# Patient Record
Sex: MALE | Race: WHITE | NOT HISPANIC OR LATINO | Employment: FULL TIME | URBAN - METROPOLITAN AREA
[De-identification: names, ages, dates, MRNs, and addresses within clinical notes are randomized per-mention and may not be internally consistent; named-entity substitution may affect disease eponyms.]

---

## 2020-12-08 ENCOUNTER — OFFICE VISIT (OUTPATIENT)
Dept: FAMILY MEDICINE CLINIC | Facility: CLINIC | Age: 40
End: 2020-12-08

## 2020-12-08 VITALS
TEMPERATURE: 96 F | OXYGEN SATURATION: 99 % | HEART RATE: 72 BPM | RESPIRATION RATE: 16 BRPM | BODY MASS INDEX: 27.04 KG/M2 | WEIGHT: 204 LBS | HEIGHT: 73 IN | DIASTOLIC BLOOD PRESSURE: 80 MMHG | SYSTOLIC BLOOD PRESSURE: 124 MMHG

## 2020-12-08 DIAGNOSIS — Z11.3 ROUTINE SCREENING FOR STI (SEXUALLY TRANSMITTED INFECTION): ICD-10-CM

## 2020-12-08 DIAGNOSIS — Z13.220 ENCOUNTER FOR LIPID SCREENING FOR CARDIOVASCULAR DISEASE: ICD-10-CM

## 2020-12-08 DIAGNOSIS — Z13.6 ENCOUNTER FOR LIPID SCREENING FOR CARDIOVASCULAR DISEASE: ICD-10-CM

## 2020-12-08 DIAGNOSIS — L70.8 OTHER ACNE: ICD-10-CM

## 2020-12-08 DIAGNOSIS — Z13.1 SCREENING FOR DIABETES MELLITUS: ICD-10-CM

## 2020-12-08 DIAGNOSIS — Z23 NEED FOR VACCINATION: ICD-10-CM

## 2020-12-08 DIAGNOSIS — Z00.00 ROUTINE HEALTH MAINTENANCE: Primary | ICD-10-CM

## 2020-12-08 PROBLEM — S83.241A TEAR OF MEDIAL MENISCUS OF RIGHT KNEE: Status: ACTIVE | Noted: 2019-05-24

## 2020-12-08 PROCEDURE — 90472 IMMUNIZATION ADMIN EACH ADD: CPT | Performed by: FAMILY MEDICINE

## 2020-12-08 PROCEDURE — 90686 IIV4 VACC NO PRSV 0.5 ML IM: CPT | Performed by: FAMILY MEDICINE

## 2020-12-08 PROCEDURE — 99385 PREV VISIT NEW AGE 18-39: CPT | Performed by: FAMILY MEDICINE

## 2020-12-08 PROCEDURE — 90471 IMMUNIZATION ADMIN: CPT | Performed by: FAMILY MEDICINE

## 2020-12-08 PROCEDURE — 90715 TDAP VACCINE 7 YRS/> IM: CPT | Performed by: FAMILY MEDICINE

## 2020-12-08 RX ORDER — TRETINOIN 0.05 G/100G
1 GEL TOPICAL NIGHTLY
Qty: 45 G | Refills: 5 | Status: SHIPPED | OUTPATIENT
Start: 2020-12-08 | End: 2022-05-02 | Stop reason: SDUPTHER

## 2020-12-08 NOTE — PROGRESS NOTES
Tobias Varghese is 40 y.o. and presents today for:     Chief Complaint   Patient presents with   • Annual Exam       HPI     Here today to reestablish care.  Previously seen by myself at [facility].    Overall considers himself quite healthy.  Has a few minor concerns.  Would like STI testing.  He and his  had an additional partner briefly in the last few months.  No current signs or symptoms but likes to be on the safe side.    Hoping for a prescription for tretinoin gel to help with some sun related skin damage.  Is of [place] descent with light skin and has had lots of sun exposure over the years.    Stays physically active, eats a balanced diet.  Would like to get some routine blood work today.    Feels that he is managing the stress of the pandemic well.  Has good family and social support.  Works out regularly to maintain physical and mental health.    Would like to get a flu shot and tetanus booster today.    The following portions of the patient's history were reviewed and updated as appropriate: allergies, current medications, past family history, past medical history, past social history, past surgical history and problem list.    Review of Systems   Constitutional: Negative for activity change, chills, fatigue, fever and unexpected weight change.   HENT: Negative for sore throat.    Respiratory: Negative for cough, shortness of breath and wheezing.    Cardiovascular: Negative for chest pain, palpitations and leg swelling.   Gastrointestinal: Negative for abdominal distention, abdominal pain, constipation, diarrhea, nausea and vomiting.   Genitourinary: Negative for dysuria.   Musculoskeletal: Positive for myalgias. Negative for arthralgias.   Skin: Negative for rash.   Neurological: Negative for dizziness.   Psychiatric/Behavioral: Negative for confusion and dysphoric mood. The patient is not nervous/anxious.        Objective  Vitals:    12/08/20 1523   BP: 124/80   Pulse: 72   Resp: 16   Temp: 96 °F (35.6  °C)   SpO2: 99%     Body mass index is 26.91 kg/m².    Physical Exam  Vitals signs and nursing note reviewed.   Constitutional:       General: He is not in acute distress.     Appearance: Normal appearance. He is well-developed. He is not ill-appearing.   HENT:      Right Ear: Tympanic membrane, ear canal and external ear normal.      Left Ear: Tympanic membrane, ear canal and external ear normal.      Nose: Nose normal.      Mouth/Throat:      Mouth: Mucous membranes are moist.      Pharynx: Oropharynx is clear.   Eyes:      Extraocular Movements: Extraocular movements intact.      Conjunctiva/sclera: Conjunctivae normal.      Pupils: Pupils are equal, round, and reactive to light.   Neck:      Musculoskeletal: Normal range of motion and neck supple.   Cardiovascular:      Rate and Rhythm: Normal rate and regular rhythm.      Pulses: Normal pulses.      Heart sounds: Normal heart sounds. No murmur.   Pulmonary:      Effort: Pulmonary effort is normal. No respiratory distress.      Breath sounds: Normal breath sounds. No wheezing.   Abdominal:      General: Bowel sounds are normal. There is no distension.      Palpations: Abdomen is soft.      Tenderness: There is no abdominal tenderness. There is no guarding or rebound.   Musculoskeletal: Normal range of motion.         General: No tenderness.   Skin:     General: Skin is warm and dry.   Neurological:      General: No focal deficit present.      Mental Status: He is alert and oriented to person, place, and time.      Sensory: No sensory deficit.   Psychiatric:         Mood and Affect: Mood normal.         Behavior: Behavior normal.           Current Outpatient Medications:   •  tretinoin (ALTRALIN) 0.05 % gel, Apply 1 application topically to the appropriate area as directed Every Night., Disp: 45 g, Rfl: 5  Current outpatient and discharge medications have been reconciled for the patient.  Reviewed by: Noah Rodriguez MD      Procedures    Lab Results (most  recent)     None                Diagnoses and all orders for this visit:    1. Routine health maintenance (Primary)    2. Screening for diabetes mellitus  -     Comprehensive Metabolic Panel    3. Encounter for lipid screening for cardiovascular disease  -     Lipid Panel With LDL / HDL Ratio    4. Need for vaccination  -     Tdap Vaccine Greater Than or Equal To 8yo IM  -     Fluarix/Fluzone/Afluria Quad>6 Months    5. Routine screening for STI (sexually transmitted infection)  -     Chlamydia trachomatis, Neisseria gonorrhoeae, PCR - Urine, Urine, Clean Catch  -     RPR  -     HIV-1 / O / 2 Ag / Antibody 4th Generation    6. Other acne  -     tretinoin (ALTRALIN) 0.05 % gel; Apply 1 application topically to the appropriate area as directed Every Night.  Dispense: 45 g; Refill: 5    Orders as above.  I will contact him with results as available.  Tretinoin as requested.  Vaccines as requested.    Recommended follow-up as below.  Encouraged communication via [employer] in the meantime.    Any information loaded into the AVS was placed there by KRISHAN Rodriguez MD, and patient was counseled on the same.   Return in about 6 months (around 6/8/2021), or if symptoms worsen or fail to improve.      KRISHAN Rodriguez MD    Prevention counseling performed as below:  Mindfulness meditation for stress management.

## 2020-12-09 LAB
ALBUMIN SERPL-MCNC: 4.5 G/DL (ref 4–5)
ALBUMIN/GLOB SERPL: 1.8 {RATIO} (ref 1.2–2.2)
ALP SERPL-CCNC: 40 IU/L (ref 39–117)
ALT SERPL-CCNC: 16 IU/L (ref 0–44)
AST SERPL-CCNC: 30 IU/L (ref 0–40)
BILIRUB SERPL-MCNC: 0.5 MG/DL (ref 0–1.2)
BUN SERPL-MCNC: 22 MG/DL (ref 6–20)
BUN/CREAT SERPL: 22 (ref 9–20)
CALCIUM SERPL-MCNC: 10.2 MG/DL (ref 8.7–10.2)
CHLORIDE SERPL-SCNC: 101 MMOL/L (ref 96–106)
CHOLEST SERPL-MCNC: 255 MG/DL (ref 100–199)
CO2 SERPL-SCNC: 24 MMOL/L (ref 20–29)
CREAT SERPL-MCNC: 0.98 MG/DL (ref 0.76–1.27)
GLOBULIN SER CALC-MCNC: 2.5 G/DL (ref 1.5–4.5)
GLUCOSE SERPL-MCNC: 93 MG/DL (ref 65–99)
HDLC SERPL-MCNC: 79 MG/DL
HIV 1+2 AB+HIV1 P24 AG SERPL QL IA: NON REACTIVE
LDLC SERPL CALC-MCNC: 161 MG/DL (ref 0–99)
LDLC/HDLC SERPL: 2 RATIO (ref 0–3.6)
POTASSIUM SERPL-SCNC: 4.4 MMOL/L (ref 3.5–5.2)
PROT SERPL-MCNC: 7 G/DL (ref 6–8.5)
RPR SER QL: NON REACTIVE
SODIUM SERPL-SCNC: 139 MMOL/L (ref 134–144)
TRIGL SERPL-MCNC: 89 MG/DL (ref 0–149)
VLDLC SERPL CALC-MCNC: 15 MG/DL (ref 5–40)

## 2020-12-09 NOTE — PATIENT INSTRUCTIONS
Mindfulness apps: Headspace, Calm    Mindfulness-Based Stress Reduction  Mindfulness-based stress reduction (MBSR) is a program that helps people learn to practice mindfulness. Mindfulness is the practice of intentionally paying attention to the present moment. It can be learned and practiced through techniques such as education, breathing exercises, meditation, and yoga. MBSR includes several mindfulness techniques in one program.  MBSR works best when you understand the treatment, are willing to try new things, and can commit to spending time practicing what you learn. MBSR training may include learning about:  · How your emotions, thoughts, and reactions affect your body.  · New ways to respond to things that cause negative thoughts to start (triggers).  · How to notice your thoughts and let go of them.  · Practicing awareness of everyday things that you normally do without thinking.  · The techniques and goals of different types of meditation.  What are the benefits of MBSR?  MBSR can have many benefits, which include helping you to:  · Develop self-awareness. This refers to knowing and understanding yourself.  · Learn skills and attitudes that help you to participate in your own health care.  · Learn new ways to care for yourself.  · Be more accepting about how things are, and let things go.  · Be less judgmental and approach things with an open mind.  · Be patient with yourself and trust yourself more.  MBSR has also been shown to:  · Reduce negative emotions, such as depression and anxiety.  · Improve memory and focus.  · Change how you sense and approach pain.  · Boost your body's ability to fight infections.  · Help you connect better with other people.  · Improve your sense of well-being.  Follow these instructions at home:    · Find a local in-person or online MBSR program.  · Set aside some time regularly for mindfulness practice.  · Find a mindfulness practice that works best for you. This may include  one or more of the following:  ? Meditation. Meditation involves focusing your mind on a certain thought or activity.  ? Breathing awareness exercises. These help you to stay present by focusing on your breath.  ? Body scan. For this practice, you lie down and pay attention to each part of your body from head to toe. You can identify tension and soreness and intentionally relax parts of your body.  ? Yoga. Yoga involves stretching and breathing, and it can improve your ability to move and be flexible. It can also provide an experience of testing your body's limits, which can help you release stress.  ? Mindful eating. This way of eating involves focusing on the taste, texture, color, and smell of each bite of food. Because this slows down eating and helps you feel full sooner, it can be an important part of a weight-loss plan.  · Find a podcast or recording that provides guidance for breathing awareness, body scan, or meditation exercises. You can listen to these any time when you have a free moment to rest without distractions.  · Follow your treatment plan as told by your health care provider. This may include taking regular medicines and making changes to your diet or lifestyle as recommended.  How to practice mindfulness  To do a basic awareness exercise:  · Find a comfortable place to sit.  · Pay attention to the present moment. Observe your thoughts, feelings, and surroundings just as they are.  · Avoid placing judgment on yourself, your feelings, or your surroundings. Make note of any judgment that comes up, and let it go.  · Your mind may wander, and that is okay. Make note of when your thoughts drift, and return your attention to the present moment.  To do basic mindfulness meditation:  · Find a comfortable place to sit. This may include a stable chair or a firm floor cushion.  ? Sit upright with your back straight. Let your arms fall next to your side with your hands resting on your legs.  ? If sitting in  a chair, rest your feet flat on the floor.  ? If sitting on a cushion, cross your legs in front of you.  · Keep your head in a neutral position with your chin dropped slightly. Relax your jaw and rest the tip of your tongue on the roof of your mouth. Drop your gaze to the floor. You can close your eyes if you like.  · Breathe normally and pay attention to your breath. Feel the air moving in and out of your nose. Feel your belly expanding and relaxing with each breath.  · Your mind may wander, and that is okay. Make note of when your thoughts drift, and return your attention to your breath.  · Avoid placing judgment on yourself, your feelings, or your surroundings. Make note of any judgment or feelings that come up, let them go, and bring your attention back to your breath.  · When you are ready, lift your gaze or open your eyes. Pay attention to how your body feels after the meditation.  Where to find more information  You can find more information about MBSR from:  · Your health care provider.  · Community-based meditation centers or programs.  · Programs offered near you.  Summary  · Mindfulness-based stress reduction (MBSR) is a program that teaches you how to intentionally pay attention to the present moment. It is used with other treatments to help you cope better with daily stress, emotions, and pain.  · MBSR focuses on developing self-awareness, which allows you to respond to life stress without judgment or negative emotions.  · MBSR programs may involve learning different mindfulness practices, such as breathing exercises, meditation, yoga, body scan, or mindful eating. Find a mindfulness practice that works best for you, and set aside time for it on a regular basis.  This information is not intended to replace advice given to you by your health care provider. Make sure you discuss any questions you have with your health care provider.  Document Released: 04/26/2018 Document Revised: 11/30/2018 Document  Reviewed: 04/26/2018  Elsevier Patient Education © 2020 Elsevier Inc.

## 2020-12-10 LAB
C TRACH RRNA SPEC QL NAA+PROBE: NEGATIVE
N GONORRHOEA RRNA SPEC QL NAA+PROBE: NEGATIVE

## 2021-01-07 ENCOUNTER — TELEPHONE (OUTPATIENT)
Dept: FAMILY MEDICINE CLINIC | Facility: CLINIC | Age: 41
End: 2021-01-07

## 2021-01-07 NOTE — TELEPHONE ENCOUNTER
PT CALLED STATING HE IS COVID POSITIVE AND IS EXPERIENCING SOA, COUGH, AND HAD A LOW GRADE FEVER YESTERDAY BUT NO FEVER TODAY.    HE IS WANTING TO KNOW IF THERE IS ANY RECOMMENDATIONS FOR WHAT TO DO OTHER THAN QUARANTINE.    PLEASE ADVISE.    CALLBACK NUMBER: [phone]

## 2021-01-07 NOTE — TELEPHONE ENCOUNTER
Called pt and talked to him about things to do to help with symptoms    Advised to call with any questions.  Also advised that if shortness of breath becomes a problem then he can go to the er

## 2021-03-02 ENCOUNTER — TELEPHONE (OUTPATIENT)
Dept: FAMILY MEDICINE CLINIC | Facility: CLINIC | Age: 41
End: 2021-03-02

## 2021-03-02 NOTE — TELEPHONE ENCOUNTER
PATIENT CALLING BECAUSE HE HAS HAD 3 KNEE SURGERY'S IN PAST 3 YEARS AND HE IS HAVING ISSUES AGAIN....     HE IS REQUESTING A RECOMMENDATION FOR ANOTHER KNEE SPECIALIST TO GET A SECOND OPINION BEFORE MORE SURGERIES ARE DONE IF NOT NEEDED...    IS THERE A KNEE SPECIALIST THAT DR. MILLER WOULD RECOMMEND PATIENT GET A SECOND OPINION FROM??    PLEASE CALL AND INFORM PATIENT.     PATIENT CAN BE REACHED AT: [phone]

## 2021-04-16 ENCOUNTER — BULK ORDERING (OUTPATIENT)
Dept: CASE MANAGEMENT | Facility: OTHER | Age: 41
End: 2021-04-16

## 2021-04-16 DIAGNOSIS — Z23 IMMUNIZATION DUE: ICD-10-CM

## 2021-11-30 ENCOUNTER — OFFICE VISIT (OUTPATIENT)
Dept: FAMILY MEDICINE CLINIC | Facility: CLINIC | Age: 41
End: 2021-11-30

## 2021-11-30 VITALS
HEART RATE: 78 BPM | BODY MASS INDEX: 26.25 KG/M2 | RESPIRATION RATE: 16 BRPM | OXYGEN SATURATION: 99 % | DIASTOLIC BLOOD PRESSURE: 64 MMHG | WEIGHT: 199 LBS | SYSTOLIC BLOOD PRESSURE: 120 MMHG

## 2021-11-30 DIAGNOSIS — Z11.59 ENCOUNTER FOR HEPATITIS C SCREENING TEST FOR LOW RISK PATIENT: ICD-10-CM

## 2021-11-30 DIAGNOSIS — Z00.00 ROUTINE HEALTH MAINTENANCE: Primary | ICD-10-CM

## 2021-11-30 DIAGNOSIS — Z11.3 ROUTINE SCREENING FOR STI (SEXUALLY TRANSMITTED INFECTION): ICD-10-CM

## 2021-11-30 DIAGNOSIS — Z13.220 ENCOUNTER FOR LIPID SCREENING FOR CARDIOVASCULAR DISEASE: ICD-10-CM

## 2021-11-30 DIAGNOSIS — Z13.1 SCREENING FOR DIABETES MELLITUS: ICD-10-CM

## 2021-11-30 DIAGNOSIS — Z23 NEED FOR VACCINATION: ICD-10-CM

## 2021-11-30 DIAGNOSIS — Z13.6 ENCOUNTER FOR LIPID SCREENING FOR CARDIOVASCULAR DISEASE: ICD-10-CM

## 2021-11-30 PROCEDURE — 99396 PREV VISIT EST AGE 40-64: CPT | Performed by: FAMILY MEDICINE

## 2021-11-30 RX ORDER — MELOXICAM 15 MG/1
15 TABLET ORAL DAILY
COMMUNITY
Start: 2021-08-30 | End: 2022-05-26

## 2021-11-30 NOTE — PROGRESS NOTES
Chief Complaint  Physical   Subjective    History of Present Illness {CC  Problem List  Visit  Diagnosis   Encounters  Notes  Medications  Labs  Result Review Imaging  Media :23}     Tobias Varghese presents to [facility] for Annual Exam and Labs Only.  History of Present Illness     Here today for routine health maintenance and annual exam. Doing well overall, considers himself fairly healthy. 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    Due for some routine blood work today. Would also like STI screening. No specific concerns or symptoms at this time, just likes to stay updated.    Has good family and social support. Enjoys his work. Gets regular dental checks.    Objective     Vital Signs:   /64   Pulse 78   Resp 16   Wt 90.3 kg (199 lb)   SpO2 99%   BMI 26.25 kg/m²   Physical Exam  Vitals and nursing note reviewed.   Constitutional:       General: He is not in acute distress.     Appearance: Normal appearance. He is well-developed. He is not ill-appearing.   HENT:      Right Ear: Tympanic membrane, ear canal and external ear normal.      Left Ear: Tympanic membrane, ear canal and external ear normal.      Nose: Nose normal.      Mouth/Throat:      Mouth: Mucous membranes are moist.      Pharynx: Oropharynx is clear.   Eyes:      Extraocular Movements: Extraocular movements intact.      Conjunctiva/sclera: Conjunctivae normal.      Pupils: Pupils are equal, round, and reactive to light.   Cardiovascular:      Rate and Rhythm: Normal rate and regular rhythm.      Pulses: Normal pulses.      Heart sounds: Normal heart sounds. No murmur heard.      Pulmonary:      Effort: Pulmonary effort is normal. No respiratory distress.      Breath sounds: Normal breath sounds. No wheezing or rales.   Abdominal:      General: Abdomen is flat. Bowel sounds are normal. There is no  distension.      Palpations: Abdomen is soft.      Tenderness: There is no abdominal tenderness. There is no guarding or rebound.   Musculoskeletal:         General: No tenderness. Normal range of motion.      Cervical back: Normal range of motion and neck supple.   Skin:     General: Skin is warm and dry.   Neurological:      General: No focal deficit present.      Mental Status: He is alert and oriented to person, place, and time. Mental status is at baseline.      Sensory: No sensory deficit.   Psychiatric:         Mood and Affect: Mood normal.         Behavior: Behavior normal.          Result Review  Data Reviewed:{ Labs  Result Review  Imaging  Med Tab  Media :23}                   Assessment and Plan {CC Problem List  Visit Diagnosis  ROS  Review (Popup)  Health Maintenance  Quality  BestPractice  Medications  SmartSets  SnapShot Encounters  Media :23}   Diagnoses and all orders for this visit:    1. Routine health maintenance (Primary)    2. Screening for diabetes mellitus  -     Comprehensive Metabolic Panel    3. Encounter for lipid screening for cardiovascular disease  -     Lipid Panel    4. Encounter for hepatitis C screening test for low risk patient  -     Hepatitis C Antibody    5. Need for vaccination  -     Cancel: COVID-19 Vaccine (Pfizer)    6. Routine screening for STI (sexually transmitted infection)  -     Ct / GC CIRA, Pharyngeal - Swab, Throat  -     CT / NG PCR, Rectal - Swab, Large Intestine, Rectum  -     Chlamydia trachomatis, Neisseria gonorrhoeae, PCR - Urine, Urine, Clean Catch  -     RPR  -     HIV-1 / O / 2 Ag / Antibody 4th Generation    Orders as above. I will contact him with results as available. Encouraged him to continue with positive lifestyle choices.    Recommended follow-up as below. Encouraged communication via [employer] meantime.      Follow Up {Instructions Charge Capture  Follow-up Communications :23}     Patient was given instructions and counseling  regarding his condition or for health maintenance advice. Please see specific information pulled into the AVS (placed there by myself) if appropriate.    Return in about 1 year (around 11/30/2022) for Preventive Health Maintenance.      KRISHAN Rodriguez MD    Prevention counseling performed as below: Mindfulness for stress management.

## 2021-12-01 LAB
ALBUMIN SERPL-MCNC: 4.9 G/DL (ref 4–5)
ALBUMIN/GLOB SERPL: 2 {RATIO} (ref 1.2–2.2)
ALP SERPL-CCNC: 42 IU/L (ref 44–121)
ALT SERPL-CCNC: 20 IU/L (ref 0–44)
AST SERPL-CCNC: 29 IU/L (ref 0–40)
BILIRUB SERPL-MCNC: 0.4 MG/DL (ref 0–1.2)
BUN SERPL-MCNC: 14 MG/DL (ref 6–24)
BUN/CREAT SERPL: 14 (ref 9–20)
CALCIUM SERPL-MCNC: 9.9 MG/DL (ref 8.7–10.2)
CHLORIDE SERPL-SCNC: 101 MMOL/L (ref 96–106)
CHOLEST SERPL-MCNC: 259 MG/DL (ref 100–199)
CO2 SERPL-SCNC: 24 MMOL/L (ref 20–29)
CREAT SERPL-MCNC: 1.02 MG/DL (ref 0.76–1.27)
GLOBULIN SER CALC-MCNC: 2.4 G/DL (ref 1.5–4.5)
GLUCOSE SERPL-MCNC: 96 MG/DL (ref 65–99)
HCV AB S/CO SERPL IA: 0.1 S/CO RATIO (ref 0–0.9)
HDLC SERPL-MCNC: 98 MG/DL
HIV 1+2 AB+HIV1 P24 AG SERPL QL IA: NON REACTIVE
LDLC SERPL CALC-MCNC: 152 MG/DL (ref 0–99)
POTASSIUM SERPL-SCNC: 4.7 MMOL/L (ref 3.5–5.2)
PROT SERPL-MCNC: 7.3 G/DL (ref 6–8.5)
RPR SER QL: NON REACTIVE
SODIUM SERPL-SCNC: 139 MMOL/L (ref 134–144)
TRIGL SERPL-MCNC: 57 MG/DL (ref 0–149)
VLDLC SERPL CALC-MCNC: 9 MG/DL (ref 5–40)

## 2021-12-02 LAB
C TRACH DNA ANORECTL QL NAA+PROBE: NEGATIVE
C TRACH RRNA NPH QL NAA+PROBE: NEGATIVE
C TRACH RRNA SPEC QL NAA+PROBE: NEGATIVE
N GONORRHOEA RRNA ANORECTL QL NAA+PROBE: NEGATIVE
N GONORRHOEA RRNA NPH QL NAA+PROBE: NEGATIVE
N GONORRHOEA RRNA SPEC QL NAA+PROBE: NEGATIVE

## 2022-05-02 DIAGNOSIS — L70.8 OTHER ACNE: ICD-10-CM

## 2022-05-02 RX ORDER — TRETINOIN 0.05 G/100G
1 GEL TOPICAL NIGHTLY
Qty: 45 G | Refills: 5 | Status: SHIPPED | OUTPATIENT
Start: 2022-05-02 | End: 2022-10-05 | Stop reason: SDUPTHER

## 2022-05-26 ENCOUNTER — OFFICE VISIT (OUTPATIENT)
Dept: FAMILY MEDICINE CLINIC | Facility: CLINIC | Age: 42
End: 2022-05-26

## 2022-05-26 VITALS
RESPIRATION RATE: 18 BRPM | BODY MASS INDEX: 25.73 KG/M2 | OXYGEN SATURATION: 99 % | HEART RATE: 70 BPM | DIASTOLIC BLOOD PRESSURE: 70 MMHG | WEIGHT: 195 LBS | SYSTOLIC BLOOD PRESSURE: 120 MMHG

## 2022-05-26 DIAGNOSIS — Z80.0 FAMILY HISTORY OF COLON CANCER IN FATHER: ICD-10-CM

## 2022-05-26 DIAGNOSIS — K59.01 SLOW TRANSIT CONSTIPATION: Primary | ICD-10-CM

## 2022-05-26 PROCEDURE — 99213 OFFICE O/P EST LOW 20 MIN: CPT | Performed by: FAMILY MEDICINE

## 2022-05-26 NOTE — PROGRESS NOTES
Chief Complaint  Abdominal Pain (Intermittent LLQ dull ache, sometimes worse in the morning. ) and Med Management (Would like to discuss prep/)    Subjective    History of Present Illness {CC  Problem List  Visit  Diagnosis   Encounters  Notes  Medications  Labs  Result Review Imaging  Media :23}     Tobias Varghese presents to [facility] for Abdominal Pain (Intermittent LLQ dull ache, sometimes worse in the morning. ) and Med Management (Would like to discuss prep/).  History of Present Illness     Here with concerns as above. Has been experiencing some dull RLQ pain off and on for quite some time. Started taking a probiotic, some magnesium, and drinking more water. Has seen some improvement in symptoms, but remains overall concerned. Father had colon cancer, older siblings have had colon polyps removed. Wondering if this is something he should pursue.     Objective     Vital Signs:   /70   Pulse 70   Resp 18   Wt 88.5 kg (195 lb)   SpO2 99%   BMI 25.73 kg/m²   Physical Exam  Vitals and nursing note reviewed.   Constitutional:       General: He is not in acute distress.     Appearance: Normal appearance. He is not ill-appearing.   Cardiovascular:      Rate and Rhythm: Normal rate and regular rhythm.      Pulses: Normal pulses.      Heart sounds: Normal heart sounds. No murmur heard.  Pulmonary:      Effort: Pulmonary effort is normal. No respiratory distress.      Breath sounds: Normal breath sounds. No rales.   Abdominal:      General: Abdomen is flat. Bowel sounds are normal.      Palpations: Abdomen is soft.      Comments: Slight amount of stool palpated in the left lower quadrant.   Neurological:      Mental Status: He is alert and oriented to person, place, and time. Mental status is at baseline.   Psychiatric:         Mood and Affect: Mood normal.         Behavior: Behavior normal.          Result Review  Data Reviewed:{ Labs  Result Review  Imaging  Med Tab   Media :23}                   Assessment and Plan {CC Problem List  Visit Diagnosis  ROS  Review (Popup)  Health Maintenance  Quality  BestPractice  Medications  SmartSets  SnapShot Encounters  Media :23}   Diagnoses and all orders for this visit:    1. Slow transit constipation (Primary)    2. Family history of colon cancer in father  -     Ambulatory Referral For Screening Colonoscopy    Order for colonoscopy as above. Discussed management of chronic constipation. Instructions provided in the after visit summary today.    Recommended follow-up as below. Encouraged communication the [employer] the meantime.    Patient was given instructions and counseling regarding his condition or for health maintenance advice. Please see specific information pulled into the AVS (placed there by myself) if appropriate.    No follow-ups on file.      KRISHAN Rodriguez MD

## 2022-05-27 NOTE — PATIENT INSTRUCTIONS
Mix polyethylene glycol (MiraLAX) according to instructions in a large pitcher and keep it in your refrigerator. Drink 1-3 glasses a day to maintain 1-2 soft bowel movements daily. You can increase or decrease your intake as needed. Make sure to drink lots of water as well.    Once you're going regularly, you can stop daily MiraLAX (can take it as needed going forward) and start a large tablespoon of Metamucil in 16 ounces of water every morning. Goal remains 1-2 soft bowel movements daily.

## 2022-06-13 ENCOUNTER — PRE-PROCEDURE SCREENING (OUTPATIENT)
Dept: GASTROENTEROLOGY | Facility: CLINIC | Age: 42
End: 2022-06-13

## 2022-08-01 ENCOUNTER — PREP FOR SURGERY (OUTPATIENT)
Dept: OTHER | Facility: HOSPITAL | Age: 42
End: 2022-08-01

## 2022-08-01 DIAGNOSIS — Z80.0 FAMILY HISTORY OF COLON CANCER: Primary | ICD-10-CM

## 2022-08-01 RX ORDER — SODIUM CHLORIDE, SODIUM LACTATE, POTASSIUM CHLORIDE, CALCIUM CHLORIDE 600; 310; 30; 20 MG/100ML; MG/100ML; MG/100ML; MG/100ML
30 INJECTION, SOLUTION INTRAVENOUS CONTINUOUS
Status: CANCELLED | OUTPATIENT
Start: 2022-08-01

## 2022-08-10 ENCOUNTER — CLINICAL SUPPORT (OUTPATIENT)
Dept: FAMILY MEDICINE CLINIC | Facility: CLINIC | Age: 42
End: 2022-08-10

## 2022-08-10 DIAGNOSIS — Z23 NEED FOR VACCINATION: Primary | ICD-10-CM

## 2022-08-10 PROCEDURE — 90471 IMMUNIZATION ADMIN: CPT | Performed by: FAMILY MEDICINE

## 2022-08-10 PROCEDURE — 90611 SMALLPOX&MONKEYPOX VAC 0.5ML: CPT | Performed by: FAMILY MEDICINE

## 2022-09-07 ENCOUNTER — CLINICAL SUPPORT (OUTPATIENT)
Dept: FAMILY MEDICINE CLINIC | Facility: CLINIC | Age: 42
End: 2022-09-07

## 2022-09-07 DIAGNOSIS — Z23 NEED FOR VACCINATION: Primary | ICD-10-CM

## 2022-09-07 PROCEDURE — 90611 SMALLPOX&MONKEYPOX VAC 0.5ML: CPT | Performed by: FAMILY MEDICINE

## 2022-09-07 PROCEDURE — 90471 IMMUNIZATION ADMIN: CPT | Performed by: FAMILY MEDICINE

## 2022-09-09 RX ORDER — IBUPROFEN 800 MG/1
800 TABLET ORAL EVERY 6 HOURS PRN
Qty: 30 TABLET | Refills: 0 | Status: SHIPPED | OUTPATIENT
Start: 2022-09-09

## 2022-09-26 ENCOUNTER — TELEPHONE (OUTPATIENT)
Dept: GASTROENTEROLOGY | Facility: CLINIC | Age: 42
End: 2022-09-26

## 2022-09-26 NOTE — TELEPHONE ENCOUNTER
Caller: Tobias Varghese    Relationship to patient: Self    Best call back number: [phone]    Patient is needing: PATIENT CALLED IN ASKING TO SCHEDULE COLONOSCOPY, REQUESTING A CALL BACK TO SCHEDULE.

## 2022-10-05 DIAGNOSIS — L70.8 OTHER ACNE: ICD-10-CM

## 2022-10-05 RX ORDER — TRETINOIN 0.05 G/100G
1 GEL TOPICAL NIGHTLY
Qty: 45 G | Refills: 5 | Status: SHIPPED | OUTPATIENT
Start: 2022-10-05

## 2022-12-01 ENCOUNTER — OFFICE VISIT (OUTPATIENT)
Dept: FAMILY MEDICINE CLINIC | Facility: CLINIC | Age: 42
End: 2022-12-01

## 2022-12-01 VITALS
HEIGHT: 73 IN | DIASTOLIC BLOOD PRESSURE: 70 MMHG | OXYGEN SATURATION: 97 % | SYSTOLIC BLOOD PRESSURE: 110 MMHG | WEIGHT: 196 LBS | BODY MASS INDEX: 25.98 KG/M2 | HEART RATE: 98 BPM

## 2022-12-01 DIAGNOSIS — Z23 NEED FOR VACCINATION: ICD-10-CM

## 2022-12-01 DIAGNOSIS — Z00.00 ROUTINE HEALTH MAINTENANCE: Primary | ICD-10-CM

## 2022-12-01 DIAGNOSIS — Z13.220 ENCOUNTER FOR LIPID SCREENING FOR CARDIOVASCULAR DISEASE: ICD-10-CM

## 2022-12-01 DIAGNOSIS — Z13.6 ENCOUNTER FOR LIPID SCREENING FOR CARDIOVASCULAR DISEASE: ICD-10-CM

## 2022-12-01 DIAGNOSIS — Z13.1 SCREENING FOR DIABETES MELLITUS: ICD-10-CM

## 2022-12-01 PROCEDURE — 90471 IMMUNIZATION ADMIN: CPT | Performed by: FAMILY MEDICINE

## 2022-12-01 PROCEDURE — 99396 PREV VISIT EST AGE 40-64: CPT | Performed by: FAMILY MEDICINE

## 2022-12-01 PROCEDURE — 90686 IIV4 VACC NO PRSV 0.5 ML IM: CPT | Performed by: FAMILY MEDICINE

## 2022-12-01 PROCEDURE — 91312 COVID-19 (PFIZER) BIVALENT BOOSTER 12+YRS: CPT | Performed by: FAMILY MEDICINE

## 2022-12-01 NOTE — PROGRESS NOTES
"Chief Complaint  Annual Exam    Subjective    History of Present Illness {CC  Problem List  Visit  Diagnosis   Encounters  Notes  Medications  Labs  Result Review Imaging  Media :23}     Tobias Varghese presents to [facility] for Annual Exam.  History of Present Illness     Here today for annual exam and to discuss preventive health maintenance.    Doing quite well overall. No acute concerns. Has had some recent neck and back pain that has improved with some home exercises. Would like to get some routine blood work today. Also due for flu shot and COVID booster.    Continues to exercise regularly. No major changes to diet. Weight is overall stable.    Has good family and social support. Enjoys his work. Gets regular dental checks.    Objective     Vital Signs:   /70   Pulse 98   Ht 185.4 cm (73\")   Wt 88.9 kg (196 lb)   SpO2 97%   BMI 25.86 kg/m²   Physical Exam  Vitals and nursing note reviewed.   Constitutional:       General: He is not in acute distress.     Appearance: Normal appearance. He is not ill-appearing.   Cardiovascular:      Rate and Rhythm: Normal rate and regular rhythm.      Pulses: Normal pulses.      Heart sounds: Normal heart sounds. No murmur heard.  Pulmonary:      Effort: Pulmonary effort is normal. No respiratory distress.      Breath sounds: Normal breath sounds. No rales.   Neurological:      Mental Status: He is alert and oriented to person, place, and time. Mental status is at baseline.   Psychiatric:         Mood and Affect: Mood normal.         Behavior: Behavior normal.          Result Review  Data Reviewed:{ Labs  Result Review  Imaging  Med Tab  Media :23}                   Assessment and Plan {CC Problem List  Visit Diagnosis  ROS  Review (Popup)  Health Maintenance  Quality  BestPractice  Medications  SmartSets  SnapShot Encounters  Media :23}   Diagnoses and all orders for this visit:    1. Routine health maintenance " (Primary)    2. Screening for diabetes mellitus  -     Comprehensive Metabolic Panel    3. Encounter for lipid screening for cardiovascular disease  -     Lipid Panel    4. Need for vaccination  -     FluLaval/Fluzone >6 mos (5595-3122)  -     COVID-19 Bivalent Booster (Pfizer) 12+yrs    Orders as above. I will contact him with lab results as available.    Vaccines as requested.    Encouraged to continue working on healthy lifestyle habits. Recommended follow-up as below. Encouraged communication via [employer] in the meantime.    Patient was given instructions and counseling regarding his condition or for health maintenance advice. Please see specific information pulled into the AVS (placed there by myself) if appropriate.    Return in about 1 year (around 12/1/2023), or if symptoms worsen or fail to improve, for Preventive Health Maintenance.      KRISHAN Rodriguez MD

## 2022-12-02 LAB
ALBUMIN SERPL-MCNC: 5 G/DL (ref 3.5–5.2)
ALBUMIN/GLOB SERPL: 2.4 G/DL
ALP SERPL-CCNC: 47 U/L (ref 39–117)
ALT SERPL-CCNC: 17 U/L (ref 1–41)
AST SERPL-CCNC: 25 U/L (ref 1–40)
BILIRUB SERPL-MCNC: 0.6 MG/DL (ref 0–1.2)
BUN SERPL-MCNC: 13 MG/DL (ref 6–20)
BUN/CREAT SERPL: 12.7 (ref 7–25)
CALCIUM SERPL-MCNC: 9.8 MG/DL (ref 8.6–10.5)
CHLORIDE SERPL-SCNC: 100 MMOL/L (ref 98–107)
CHOLEST SERPL-MCNC: 258 MG/DL (ref 0–200)
CO2 SERPL-SCNC: 29.2 MMOL/L (ref 22–29)
CREAT SERPL-MCNC: 1.02 MG/DL (ref 0.76–1.27)
EGFRCR SERPLBLD CKD-EPI 2021: 94.7 ML/MIN/1.73
GLOBULIN SER CALC-MCNC: 2.1 GM/DL
GLUCOSE SERPL-MCNC: 72 MG/DL (ref 65–99)
HDLC SERPL-MCNC: 92 MG/DL (ref 40–60)
LDLC SERPL CALC-MCNC: 145 MG/DL (ref 0–100)
POTASSIUM SERPL-SCNC: 4.5 MMOL/L (ref 3.5–5.2)
PROT SERPL-MCNC: 7.1 G/DL (ref 6–8.5)
SODIUM SERPL-SCNC: 139 MMOL/L (ref 136–145)
TRIGL SERPL-MCNC: 122 MG/DL (ref 0–150)
VLDLC SERPL CALC-MCNC: 21 MG/DL (ref 5–40)

## 2023-05-03 DIAGNOSIS — L70.8 OTHER ACNE: ICD-10-CM

## 2023-05-04 RX ORDER — IBUPROFEN 800 MG/1
800 TABLET ORAL EVERY 6 HOURS PRN
Qty: 30 TABLET | Refills: 2 | Status: SHIPPED | OUTPATIENT
Start: 2023-05-04

## 2023-05-04 RX ORDER — TRETINOIN 0.05 G/100G
1 GEL TOPICAL NIGHTLY
Qty: 45 G | Refills: 5 | Status: SHIPPED | OUTPATIENT
Start: 2023-05-04

## 2023-08-30 ENCOUNTER — OFFICE VISIT (OUTPATIENT)
Dept: FAMILY MEDICINE CLINIC | Facility: CLINIC | Age: 43
End: 2023-08-30
Payer: COMMERCIAL

## 2023-08-30 VITALS
BODY MASS INDEX: 24.89 KG/M2 | WEIGHT: 187.8 LBS | SYSTOLIC BLOOD PRESSURE: 130 MMHG | OXYGEN SATURATION: 97 % | RESPIRATION RATE: 18 BRPM | HEIGHT: 73 IN | HEART RATE: 78 BPM | DIASTOLIC BLOOD PRESSURE: 90 MMHG

## 2023-08-30 DIAGNOSIS — K58.0 IRRITABLE BOWEL SYNDROME WITH DIARRHEA: Primary | ICD-10-CM

## 2023-08-30 DIAGNOSIS — Z11.3 ROUTINE SCREENING FOR STI (SEXUALLY TRANSMITTED INFECTION): ICD-10-CM

## 2023-08-30 PROCEDURE — 99213 OFFICE O/P EST LOW 20 MIN: CPT | Performed by: FAMILY MEDICINE

## 2023-08-30 RX ORDER — DICYCLOMINE HYDROCHLORIDE 10 MG/1
10 CAPSULE ORAL
Qty: 60 CAPSULE | Refills: 0 | Status: SHIPPED | OUTPATIENT
Start: 2023-08-30

## 2023-08-30 NOTE — PROGRESS NOTES
"Chief Complaint  Abdominal Pain    Subjective    History of Present Illness {CC  Problem List  Visit  Diagnosis   Encounters  Notes  Medications  Labs  Result Review Imaging  Media :23}     Tobias Varghese presents to [facility] for Abdominal Pain.  Abdominal Pain  This is a new problem. The current episode started more than 1 month ago. The onset quality is undetermined. The problem occurs constantly. The problem has been gradually improving. The pain is located in the periumbilical region. The pain is at a severity of 2/10. The quality of the pain is aching. The abdominal pain does not radiate. Associated symptoms include anorexia. Pertinent negatives include no arthralgias, belching, constipation, diarrhea, dysuria, fever, flatus, frequency, headaches, hematochezia, hematuria, melena, myalgias, nausea, vomiting or weight loss. Nothing aggravates the pain. The pain is relieved by Liquids.      Here today with some lower abdominal discomfort over the past week or so. Seems to have begun after a period of dietary restriction due to a partially nude role at work. Thinks that he may have developed a food allergy in that time as he is now back to his regular diet and experiencing the symptoms. Symptoms seem somewhat related to stress and anxiety, has had several episodes of cramping after restaurant meals that required urgent trips to the bathroom. Symptoms are typically relieved by bowel movement. Has not been able to identify any consistent food triggers though is concerned that lactose might be to blame. No fevers or chills. No dark tarry stools, no blood in the stool.    Would like to do some STI screening today. No concerning signs or symptoms. Likes to stay on top of it.    Objective     Vital Signs:   /90   Pulse 78   Resp 18   Ht 185.4 cm (73\")   Wt 85.2 kg (187 lb 12.8 oz)   SpO2 97%   BMI 24.78 kg/mý   Physical Exam  Vitals and nursing note reviewed. "   Constitutional:       General: He is not in acute distress.     Appearance: Normal appearance. He is not ill-appearing.   Cardiovascular:      Rate and Rhythm: Normal rate and regular rhythm.      Pulses: Normal pulses.      Heart sounds: Normal heart sounds. No murmur heard.  Pulmonary:      Effort: Pulmonary effort is normal. No respiratory distress.      Breath sounds: Normal breath sounds. No rales.   Abdominal:      General: Abdomen is flat. Bowel sounds are normal. There is no distension.      Palpations: Abdomen is soft.      Tenderness: There is no abdominal tenderness. There is no guarding or rebound.   Neurological:      Mental Status: He is alert and oriented to person, place, and time. Mental status is at baseline.   Psychiatric:         Mood and Affect: Mood normal.         Behavior: Behavior normal.        Result Review  Data Reviewed:{ Labs  Result Review  Imaging  Med Tab  Media :23}                   Assessment and Plan {CC Problem List  Visit Diagnosis  ROS  Review (Popup)  Health Maintenance  Quality  BestPractice  Medications  SmartSets  SnapShot Encounters  Media :23}   Diagnoses and all orders for this visit:    1. Irritable bowel syndrome with diarrhea (Primary)  -     dicyclomine (BENTYL) 10 MG capsule; Take 1 capsule by mouth 4 (Four) Times a Day Before Meals & at Bedtime.  Dispense: 60 capsule; Refill: 0    2. Routine screening for STI (sexually transmitted infection)  -     Ct / GC CIRA, Pharyngeal - Swab, Throat  -     CT / NG PCR, Rectal - Swab, Large Intestine, Rectum  -     Chlamydia trachomatis, Neisseria gonorrhoeae, PCR - Urine, Urine, Clean Catch  -     RPR  -     HIV-1 / O / 2 Ag / Antibody    Orders as above. We will try some dicyclomine for what sounds like some mild irritable bowel. We will also keep a symptom diary to see if we can identify any food triggers. Suggested some Lactaid if he thinks lactose intolerance is to blame.    Testing as above. I will  contact him with results as available.    Recommended follow-up as below. Encouraged communication via [employer] in the meantime.    Patient was given instructions and counseling regarding his condition or for health maintenance advice. Please see specific information pulled into the AVS (placed there by myself) if appropriate.    Return in about 4 months (around 12/30/2023), or if symptoms worsen or fail to improve, for Preventive Health Maintenance.      KRISHAN Rodriguez MD

## 2023-08-31 LAB
HIV 1+2 AB+HIV1 P24 AG SERPL QL IA: NON REACTIVE
RPR SER QL: NON REACTIVE

## 2023-10-25 RX ORDER — PREDNISONE 10 MG/1
TABLET ORAL
Qty: 42 TABLET | Refills: 0 | Status: SHIPPED | OUTPATIENT
Start: 2023-10-25

## 2023-12-05 ENCOUNTER — OFFICE VISIT (OUTPATIENT)
Dept: FAMILY MEDICINE CLINIC | Facility: CLINIC | Age: 43
End: 2023-12-05
Payer: COMMERCIAL

## 2023-12-05 VITALS
WEIGHT: 186 LBS | RESPIRATION RATE: 18 BRPM | HEIGHT: 73 IN | SYSTOLIC BLOOD PRESSURE: 132 MMHG | OXYGEN SATURATION: 98 % | DIASTOLIC BLOOD PRESSURE: 88 MMHG | BODY MASS INDEX: 24.65 KG/M2 | HEART RATE: 68 BPM

## 2023-12-05 DIAGNOSIS — Z13.6 ENCOUNTER FOR LIPID SCREENING FOR CARDIOVASCULAR DISEASE: ICD-10-CM

## 2023-12-05 DIAGNOSIS — Z00.00 ROUTINE HEALTH MAINTENANCE: Primary | ICD-10-CM

## 2023-12-05 DIAGNOSIS — B35.1 ONYCHOMYCOSIS OF RIGHT GREAT TOE: ICD-10-CM

## 2023-12-05 DIAGNOSIS — Z13.220 ENCOUNTER FOR LIPID SCREENING FOR CARDIOVASCULAR DISEASE: ICD-10-CM

## 2023-12-05 DIAGNOSIS — M54.2 NECK PAIN ON LEFT SIDE: ICD-10-CM

## 2023-12-05 DIAGNOSIS — Z13.1 SCREENING FOR DIABETES MELLITUS: ICD-10-CM

## 2023-12-05 DIAGNOSIS — Z23 NEED FOR VACCINATION: ICD-10-CM

## 2023-12-05 RX ORDER — TERBINAFINE HYDROCHLORIDE 250 MG/1
250 TABLET ORAL DAILY
Qty: 90 TABLET | Refills: 0 | Status: SHIPPED | OUTPATIENT
Start: 2023-12-05 | End: 2024-03-04

## 2023-12-05 NOTE — PROGRESS NOTES
"Chief Complaint  Annual Exam, Nail Problem (R nail x couple months ), and Neck Pain (X several months )    Subjective    History of Present Illness {CC  Problem List  Visit  Diagnosis   Encounters  Notes  Medications  Labs  Result Review Imaging  Media :23}     Tobias Varghese presents to [facility] for Annual Exam, Nail Problem (R nail x couple months ), and Neck Pain (X several months ).  History of Present Illness     Here today as above. Due for an annual exam and discussion of preventive health maintenance.    Considers himself healthy overall, does a few concerns he like to discuss. Has noticed some thickening and discoloration of his right great toe. Worried that he might have a toenail fungus. Has been trying some over-the-counter topicals without much improvement. Has heard that there is an oral medication, would like to consider it.    Has some left-sided neck pain that has been bothering him off and on for several months. Feels that there is a catch somewhere at the base of his neck on the left side that we will go away despite massages and stretching. Has been changing up the way that he is lifting at the gym to try to avoid causing any further problems. No numbness or tingling, pain does not seem to be getting any worse. Occasionally takes over-the-counter pain medications with decent relief.    Due for some routine blood work today. Is otherwise fairly well caught up on preventive health recommendations. Will be getting a COVID booster at his pharmacy in the near future. Would like to get a flu shot today.    No major changes to diet or exercise. Weight is stable.    Has good family and social support. Enjoys his work. Gets regular dental exams.    Objective     Vital Signs:   /88   Pulse 68   Resp 18   Ht 185.4 cm (73\")   Wt 84.4 kg (186 lb)   SpO2 98%   BMI 24.54 kg/m²   Physical Exam  Vitals and nursing note reviewed.   Constitutional:       General: " He is not in acute distress.     Appearance: Normal appearance. He is not ill-appearing.   Cardiovascular:      Rate and Rhythm: Normal rate and regular rhythm.      Pulses: Normal pulses.      Heart sounds: Normal heart sounds. No murmur heard.  Pulmonary:      Effort: Pulmonary effort is normal. No respiratory distress.      Breath sounds: Normal breath sounds. No rales.   Musculoskeletal:        Arms:       Comments: Tender deep in the musculature of his left lower neck. Able to recreate the pain with pressure.   Feet:      Comments: Continue yellowed nail of the right great toe. Appearance is consistent with onychomycosis.  Neurological:      Mental Status: He is alert and oriented to person, place, and time. Mental status is at baseline.   Psychiatric:         Mood and Affect: Mood normal.         Behavior: Behavior normal.          Result Review  Data Reviewed:{ Labs  Result Review  Imaging  Med Tab  Media :23}                   Assessment and Plan {CC Problem List  Visit Diagnosis  ROS  Review (Popup)  Health Maintenance  Quality  BestPractice  Medications  SmartSets  SnapShot Encounters  Media :23}   Diagnoses and all orders for this visit:    1. Routine health maintenance (Primary)    2. Onychomycosis of right great toe  -     terbinafine (lamiSIL) 250 MG tablet; Take 1 tablet by mouth Daily for 90 days.  Dispense: 90 tablet; Refill: 0    3. Neck pain on left side    4. Screening for diabetes mellitus  -     Comprehensive Metabolic Panel    5. Encounter for lipid screening for cardiovascular disease  -     Lipid Panel    6. Need for vaccination  -     Fluzone (or Fluarix & Flulaval for VFC) >6 Mos ([id number])    Orders as above. Will try terbinafine for his onychomycosis. Discussed anticipated effects potential side effects. Will follow-up in 6 weeks for labs.    Recommended physical therapy or home PT exercises for left neck pain. Provided some recommendations in his after visit  summary.    Labs as above. I will contact him with results as available. Vaccine as requested.    BMI is within normal parameters. No other follow-up for BMI required.    Encouraged to continue working on healthy lifestyle habits. Recommended follow up as below. Encouraged communication via [employer] in the meantime.     Patient was given instructions and counseling regarding his condition or for health maintenance advice. Please see specific information pulled into the AVS (placed there by myself) if appropriate.    Return in about 6 weeks (around 1/16/2024), or if symptoms worsen or fail to improve.    KRISHAN Rodriguez MD    Prevention counseling performed as below: Mindfulness for stress management.

## 2023-12-06 LAB
ALBUMIN SERPL-MCNC: 4.5 G/DL (ref 3.5–5.2)
ALBUMIN/GLOB SERPL: 2 G/DL
ALP SERPL-CCNC: 43 U/L (ref 39–117)
ALT SERPL-CCNC: 15 U/L (ref 1–41)
AST SERPL-CCNC: 23 U/L (ref 1–40)
BILIRUB SERPL-MCNC: 0.4 MG/DL (ref 0–1.2)
BUN SERPL-MCNC: 14 MG/DL (ref 6–20)
BUN/CREAT SERPL: 14.1 (ref 7–25)
CALCIUM SERPL-MCNC: 10.1 MG/DL (ref 8.6–10.5)
CHLORIDE SERPL-SCNC: 101 MMOL/L (ref 98–107)
CHOLEST SERPL-MCNC: 250 MG/DL (ref 0–200)
CO2 SERPL-SCNC: 27.7 MMOL/L (ref 22–29)
CREAT SERPL-MCNC: 0.99 MG/DL (ref 0.76–1.27)
EGFRCR SERPLBLD CKD-EPI 2021: 97.5 ML/MIN/1.73
GLOBULIN SER CALC-MCNC: 2.2 GM/DL
GLUCOSE SERPL-MCNC: 96 MG/DL (ref 65–99)
HDLC SERPL-MCNC: 115 MG/DL (ref 40–60)
LDLC SERPL CALC-MCNC: 127 MG/DL (ref 0–100)
POTASSIUM SERPL-SCNC: 4.3 MMOL/L (ref 3.5–5.2)
PROT SERPL-MCNC: 6.7 G/DL (ref 6–8.5)
SODIUM SERPL-SCNC: 140 MMOL/L (ref 136–145)
TRIGL SERPL-MCNC: 49 MG/DL (ref 0–150)
VLDLC SERPL CALC-MCNC: 8 MG/DL (ref 5–40)

## 2024-06-19 ENCOUNTER — TELEPHONE (OUTPATIENT)
Dept: FAMILY MEDICINE CLINIC | Facility: CLINIC | Age: 44
End: 2024-06-19
Payer: COMMERCIAL

## 2024-06-19 DIAGNOSIS — Z11.3 ROUTINE SCREENING FOR STI (SEXUALLY TRANSMITTED INFECTION): Primary | ICD-10-CM

## 2024-06-19 NOTE — TELEPHONE ENCOUNTER
Did not have any openings with any providers before 10 AM in the next day or two. Patient decided to go to clinic for testing

## 2024-06-21 LAB
C TRACH RRNA SPEC QL NAA+PROBE: NEGATIVE
HIV 1+2 AB+HIV1 P24 AG SERPL QL IA: NON REACTIVE
N GONORRHOEA RRNA SPEC QL NAA+PROBE: NEGATIVE
RPR SER QL: NON REACTIVE

## 2024-06-22 LAB
C TRACH DNA ANORECTL QL NAA+PROBE: NEGATIVE
C TRACH RRNA NPH QL NAA+PROBE: NEGATIVE
N GONORRHOEA RRNA ANORECTL QL NAA+PROBE: NEGATIVE
N GONORRHOEA RRNA NPH QL NAA+PROBE: NEGATIVE

## 2024-08-29 ENCOUNTER — OFFICE VISIT (OUTPATIENT)
Dept: FAMILY MEDICINE CLINIC | Facility: CLINIC | Age: 44
End: 2024-08-29
Payer: COMMERCIAL

## 2024-08-29 VITALS
OXYGEN SATURATION: 97 % | HEIGHT: 73 IN | DIASTOLIC BLOOD PRESSURE: 90 MMHG | WEIGHT: 184 LBS | SYSTOLIC BLOOD PRESSURE: 132 MMHG | RESPIRATION RATE: 18 BRPM | BODY MASS INDEX: 24.39 KG/M2 | HEART RATE: 68 BPM

## 2024-08-29 DIAGNOSIS — Z23 NEED FOR VACCINATION: ICD-10-CM

## 2024-08-29 DIAGNOSIS — M77.12 LEFT LATERAL EPICONDYLITIS: Primary | ICD-10-CM

## 2024-08-29 PROCEDURE — 99213 OFFICE O/P EST LOW 20 MIN: CPT | Performed by: FAMILY MEDICINE

## 2024-08-29 PROCEDURE — 20550 NJX 1 TENDON SHEATH/LIGAMENT: CPT | Performed by: FAMILY MEDICINE

## 2024-08-29 PROCEDURE — 90471 IMMUNIZATION ADMIN: CPT | Performed by: FAMILY MEDICINE

## 2024-08-29 PROCEDURE — 90746 HEPB VACCINE 3 DOSE ADULT IM: CPT | Performed by: FAMILY MEDICINE

## 2024-08-29 RX ORDER — TRIAMCINOLONE ACETONIDE 40 MG/ML
40 INJECTION, SUSPENSION INTRA-ARTICULAR; INTRAMUSCULAR ONCE
Status: COMPLETED | OUTPATIENT
Start: 2024-08-29 | End: 2024-08-29

## 2024-08-29 RX ADMIN — TRIAMCINOLONE ACETONIDE 40 MG: 40 INJECTION, SUSPENSION INTRA-ARTICULAR; INTRAMUSCULAR at 09:34

## 2024-08-29 NOTE — PROGRESS NOTES
"Chief Complaint  elbow pain    Subjective    History of Present Illness    Tobias Varghese presents to [facility] for elbow pain.  History of Present Illness     Here today with complaint of lateral elbow pain ongoing for several weeks. 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 Has had similar problem in the past and was able to rehab it with rest. Has tried an elbow strap though not for more than a day or 2. Wondering if an injection might help. Pain is bad enough that it affects his sleep at times.    Also due for hep B vaccine, would like to get started today.    Objective     Vital Signs:   /90   Pulse 68   Resp 18   Ht 185.4 cm (73\")   Wt 83.5 kg (184 lb)   SpO2 97%   BMI 24.28 kg/m²   Physical Exam  Vitals and nursing note reviewed.   Constitutional:       General: He is not in acute distress.     Appearance: Normal appearance. He is not ill-appearing.   Musculoskeletal:      Right elbow: Normal.      Left elbow: Tenderness present in lateral epicondyle.   Neurological:      Mental Status: He is alert.          L tennis elbow injection    Date/Time: 8/29/2024 2:57 PM    Performed by: Noah Rodriguez MD  Authorized by: Noah Rodriguez MD  Consent: Verbal consent obtained.  Risks and benefits: risks, benefits and alternatives were discussed  Consent given by: patient  Patient understanding: patient states understanding of the procedure being performed  Required items: required blood products, implants, devices, and special equipment available  Patient identity confirmed: verbally with patient  Time out: Immediately prior to procedure a \"time out\" was called to verify the correct patient, procedure, equipment, support staff and site/side marked as required.  Patient tolerance: patient tolerated the procedure well with no immediate complications  Comments: 1 mL triamcinolone 40 mg/mL and 1 mL " 1% lidocaine without epinephrine injected over the left lateral epicondyle.          Assessment and Plan   Diagnoses and all orders for this visit:    1. Left lateral epicondylitis (Primary)  -     triamcinolone acetonide (KENALOG-40) injection 40 mg  -     - Injection Tendon or Ligament    2. Need for vaccination  -     Hepatitis B Vaccine Adult IM (ENGERIX/RECOMBIVAX)    Injection as above. Discussed conservative management with an elbow strap and rest as tolerated. Anticipate resolution with time.    Vaccines discussed.      Recommended follow up as below. Encouraged communication via [employer] in the meantime.     Patient was given instructions and counseling regarding his condition or for health maintenance advice. Please see specific information pulled into the AVS (placed there by myself) if appropriate.    Return if symptoms worsen or fail to improve.    KRISHAN Rodriguez MD

## 2024-10-28 RX ORDER — PREDNISONE 20 MG/1
40 TABLET ORAL DAILY
Qty: 10 TABLET | Refills: 0 | Status: SHIPPED | OUTPATIENT
Start: 2024-10-28 | End: 2024-11-02

## 2025-02-06 ENCOUNTER — OFFICE VISIT (OUTPATIENT)
Dept: FAMILY MEDICINE CLINIC | Facility: CLINIC | Age: 45
End: 2025-02-06
Payer: COMMERCIAL

## 2025-02-06 VITALS
WEIGHT: 186 LBS | RESPIRATION RATE: 18 BRPM | HEART RATE: 71 BPM | BODY MASS INDEX: 24.65 KG/M2 | OXYGEN SATURATION: 99 % | DIASTOLIC BLOOD PRESSURE: 88 MMHG | HEIGHT: 73 IN | SYSTOLIC BLOOD PRESSURE: 130 MMHG

## 2025-02-06 DIAGNOSIS — M77.12 LEFT LATERAL EPICONDYLITIS: Primary | ICD-10-CM

## 2025-02-06 PROCEDURE — 20605 DRAIN/INJ JOINT/BURSA W/O US: CPT | Performed by: FAMILY MEDICINE

## 2025-02-06 PROCEDURE — 99212 OFFICE O/P EST SF 10 MIN: CPT | Performed by: FAMILY MEDICINE

## 2025-02-06 RX ORDER — TRIAMCINOLONE ACETONIDE 40 MG/ML
40 INJECTION, SUSPENSION INTRA-ARTICULAR; INTRAMUSCULAR ONCE
Status: COMPLETED | OUTPATIENT
Start: 2025-02-06 | End: 2025-02-06

## 2025-02-06 RX ORDER — LIDOCAINE HYDROCHLORIDE 10 MG/ML
2 INJECTION, SOLUTION EPIDURAL; INFILTRATION; INTRACAUDAL; PERINEURAL ONCE
Status: COMPLETED | OUTPATIENT
Start: 2025-02-06 | End: 2025-02-06

## 2025-02-06 RX ADMIN — TRIAMCINOLONE ACETONIDE 40 MG: 40 INJECTION, SUSPENSION INTRA-ARTICULAR; INTRAMUSCULAR at 15:58

## 2025-02-06 RX ADMIN — LIDOCAINE HYDROCHLORIDE 2 ML: 10 INJECTION, SOLUTION EPIDURAL; INFILTRATION; INTRACAUDAL; PERINEURAL at 15:57

## 2025-02-06 NOTE — PROGRESS NOTES
"Chief Complaint  Elbow Pain    Subjective    History of Present Illness    Here with worsening left elbow pain over the past few weeks. Has had tennis elbow in the right previously, symptoms are similar. Did quite well with an injection previously and is hoping for the same today. Has been trying to use an elbow strap without much improvement. Work requires a fair amount of use of his arms.    Objective     Vital Signs:   /88   Pulse 71   Resp 18   Ht 185.4 cm (73\")   Wt 84.4 kg (186 lb)   SpO2 99%   BMI 24.54 kg/m²   Physical Exam  Vitals and nursing note reviewed.   Constitutional:       General: He is not in acute distress.     Appearance: Normal appearance. He is not ill-appearing.   Musculoskeletal:      Comments: Tenderness over the lateral epicondyle of the left elbow.   Neurological:      Mental Status: He is alert.          Tennis elbow injection    Date/Time: 2/6/2025 5:23 PM    Performed by: Noah Rodriguez MD  Authorized by: Noah Rodriguez MD  Consent: Verbal consent obtained.  Risks and benefits: risks, benefits and alternatives were discussed  Consent given by: patient  Patient understanding: patient states understanding of the procedure being performed  Site marked: the operative site was marked  Patient identity confirmed: verbally with patient  Time out: Immediately prior to procedure a \"time out\" was called to verify the correct patient, procedure, equipment, support staff and site/side marked as required.  Indications: joint swelling and pain   Body area: elbow  Joint: left elbow  Local anesthesia used: no    Anesthesia:  Local anesthesia used: no    Sedation:  Patient sedated: no    Preparation: Patient was prepped and draped in the usual sterile fashion.  Comments: Injected 2 mL of 1% lidocaine without epinephrine and 1 mL of triamcinolone 40 mg/mL over the lateral epicondyle, the volume was fanned throughout the area.            Assessment and Plan   Diagnoses and " all orders for this visit:    1. Left lateral epicondylitis (Primary)  -     lidocaine PF 1% (XYLOCAINE) injection 2 mL  -     triamcinolone acetonide (KENALOG-40) injection 40 mg  -     Arthrocentesis    Tolerated injection without difficulty. Discussed aftercare and conservative management. Discussed exercises for long-term care.    Recommended follow up as below. Encouraged communication via MyChart in the meantime.     Patient was given instructions and counseling regarding his condition or for health maintenance advice. Please see specific information pulled into the AVS (placed there by myself) if appropriate.    Return if symptoms worsen or fail to improve.    KRISHAN Rodriguez MD

## 2025-03-12 ENCOUNTER — OFFICE VISIT (OUTPATIENT)
Dept: FAMILY MEDICINE CLINIC | Facility: CLINIC | Age: 45
End: 2025-03-12
Payer: COMMERCIAL

## 2025-03-12 VITALS
SYSTOLIC BLOOD PRESSURE: 132 MMHG | BODY MASS INDEX: 23.94 KG/M2 | HEART RATE: 62 BPM | HEIGHT: 73 IN | OXYGEN SATURATION: 97 % | WEIGHT: 180.6 LBS | RESPIRATION RATE: 14 BRPM | DIASTOLIC BLOOD PRESSURE: 86 MMHG

## 2025-03-12 DIAGNOSIS — F41.9 ANXIETY: ICD-10-CM

## 2025-03-12 DIAGNOSIS — M72.2 PLANTAR FASCIITIS OF RIGHT FOOT: Primary | ICD-10-CM

## 2025-03-12 DIAGNOSIS — Z23 NEED FOR VACCINATION: ICD-10-CM

## 2025-03-12 NOTE — PROGRESS NOTES
"Chief Complaint  Foot Pain (Right foot pain, has had an injection in elbow. Requesting cortisone injection today.) and Anxiety (Thinking of seeking a therapist.)    Subjective    History of Present Illness    Here with complaints of pain in the right foot, worst when he first gets out of bed, and then worse again after a long day. Believes it is likely plantar fasciitis. Wondering about possible injections or other interventions. 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    Anxiety has been somewhat worse of late, thinking about seeing a therapist. Wondering about local resources.    Would like flu and COVID boosters today.    Objective     Vital Signs:   /86   Pulse 62   Resp 14   Ht 185.4 cm (73\")   Wt 81.9 kg (180 lb 9.6 oz)   SpO2 97%   BMI 23.83 kg/m²   Physical Exam  Vitals and nursing note reviewed.   Constitutional:       General: He is not in acute distress.     Appearance: Normal appearance. He is not ill-appearing.   Cardiovascular:      Rate and Rhythm: Normal rate and regular rhythm.      Pulses: Normal pulses.      Heart sounds: Normal heart sounds. No murmur heard.  Pulmonary:      Effort: Pulmonary effort is normal. No respiratory distress.      Breath sounds: Normal breath sounds. No rales.   Musculoskeletal:      Comments: Tender over the right distal plantar heel in the area of the plantar fascia.   Neurological:      Mental Status: He is alert and oriented to person, place, and time. Mental status is at baseline.   Psychiatric:         Mood and Affect: Mood normal.         Behavior: Behavior normal.          Assessment and Plan   Diagnoses and all orders for this visit:    1. Plantar fasciitis of right foot (Primary)  -     Ambulatory Referral to Sports Medicine    2. Anxiety    3. Need for vaccination  -     Fluzone >6mos ([id number])  -     COVID-19 (Pfizer) 12yrs+ (COMIRNATY)    Discussed home interventions to help with pain relief. Referral to sports medicine for further " evaluation.    Vaccines as requested.    Recommended follow up as below. Encouraged communication via MyChart in the meantime.     Patient was given instructions and counseling regarding his condition or for health maintenance advice. Please see specific information pulled into the AVS (placed there by myself) if appropriate.    Return if symptoms worsen or fail to improve.      KRISHAN Rodriguez MD

## 2025-03-18 NOTE — TELEPHONE ENCOUNTER
DERMATOLOGY NOTE  Follow up VISIT       Chief complaint: Follow-Up (flare of prurigo nodularis)    Patient believes the dupixent has been working and has reduced his itchiness overall but patient still reports dry skin that itches.           Flare up prurigo nodularis bilateral forearms ,states started after receiving flu vaccine .      Initial Hx   HPI:Rash   Onset: all over body & back of scalp   Symptoms: itchy  Aggravating factors: when using restroom  Alleviating factors: no  New creams/topicals: cervae, Nizoral,  New medications (up to last 6 months): xigduo XR 10, hydroxyzine  Treatments: no      History of skin cancer: No  History of precancers/actinic keratoses: No  History of biopsies:No  History of blistering/severe sunburns:No  Family history of skin cancer:No  Family history of atypical moles:No      Allergies   Allergen Reactions    Other Food Rash    Wheat Bran Rash    Eggs Itching and Rash    Alendronate-Cholecalciferol Rash        MEDICATIONS:  Medications relevant to specialty reviewed.     REVIEW OF SYSTEMS:   Positive for skin (see HPI)  Negative for fevers and chills       EXAM:  There were no vitals taken for this visit.  Constitutional: Well-developed, well-nourished, and in no distress.     A focused skin exam was performed including the affected areas of the trunk and extremities. Notable findings on exam today listed below and/or in assessment/plan.     Numerous hyperpigmented healing papules on extremities    IMPRESSION / PLAN:    1. Prurigo nodularis affecting >60% BSA--much improved  On Dupixent 300 mg every 2 weeks  Tolerating well, pruritus much improved  Previously discussed course/nature of disease, shared content from [facility]  Can use TCS on hand for breakthrough pruritic papules  Pt understands importance of emollient use  Follow up 1 year, sooner if needed  Can call for refills as needed        Discussed risks, benefits, alternative treatments as well as common side effects  Last OV: 5/26/2022  Next OV: none scheduled   Last refill: 5/2/2022   associated with prescribed treatment, Patient verbalized understanding and agrees with plan regarding the above            Please note that this dictation was created using voice recognition software. I have made every reasonable attempt to correct obvious errors, but I expect that there are errors of grammar and possibly content that I did not discover before finalizing the note.      Return to clinic in: Return in about 1 year (around 3/18/2026) for prurigo nodularis follow up. and as needed for any new or changing skin lesions.

## 2025-03-31 ENCOUNTER — TELEPHONE (OUTPATIENT)
Dept: FAMILY MEDICINE CLINIC | Facility: CLINIC | Age: 45
End: 2025-03-31
Payer: COMMERCIAL

## 2025-03-31 DIAGNOSIS — M72.2 PLANTAR FASCIITIS OF RIGHT FOOT: Primary | ICD-10-CM

## 2025-04-21 ENCOUNTER — OFFICE VISIT (OUTPATIENT)
Dept: FAMILY MEDICINE CLINIC | Facility: CLINIC | Age: 45
End: 2025-04-21
Payer: COMMERCIAL

## 2025-04-21 VITALS
BODY MASS INDEX: 23.47 KG/M2 | DIASTOLIC BLOOD PRESSURE: 74 MMHG | OXYGEN SATURATION: 98 % | HEIGHT: 73 IN | HEART RATE: 71 BPM | SYSTOLIC BLOOD PRESSURE: 130 MMHG | WEIGHT: 177.1 LBS | RESPIRATION RATE: 12 BRPM

## 2025-04-21 DIAGNOSIS — M77.12 LEFT LATERAL EPICONDYLITIS: Primary | ICD-10-CM

## 2025-04-21 DIAGNOSIS — Z23 NEED FOR VACCINATION: ICD-10-CM

## 2025-04-21 PROCEDURE — 99213 OFFICE O/P EST LOW 20 MIN: CPT | Performed by: FAMILY MEDICINE

## 2025-04-21 PROCEDURE — 90739 HEPB VACC 2/4 DOSE ADULT IM: CPT | Performed by: FAMILY MEDICINE

## 2025-04-21 PROCEDURE — 20605 DRAIN/INJ JOINT/BURSA W/O US: CPT | Performed by: FAMILY MEDICINE

## 2025-04-21 PROCEDURE — 90471 IMMUNIZATION ADMIN: CPT | Performed by: FAMILY MEDICINE

## 2025-04-21 RX ORDER — TRIAMCINOLONE ACETONIDE 40 MG/ML
40 INJECTION, SUSPENSION INTRA-ARTICULAR; INTRAMUSCULAR ONCE
Status: COMPLETED | OUTPATIENT
Start: 2025-04-21 | End: 2025-04-21

## 2025-04-21 RX ORDER — LIDOCAINE HYDROCHLORIDE 10 MG/ML
1 INJECTION, SOLUTION EPIDURAL; INFILTRATION; INTRACAUDAL; PERINEURAL ONCE
Status: COMPLETED | OUTPATIENT
Start: 2025-04-21 | End: 2025-04-21

## 2025-04-21 RX ADMIN — LIDOCAINE HYDROCHLORIDE 1 ML: 10 INJECTION, SOLUTION EPIDURAL; INFILTRATION; INTRACAUDAL; PERINEURAL at 15:37

## 2025-04-21 RX ADMIN — TRIAMCINOLONE ACETONIDE 40 MG: 40 INJECTION, SUSPENSION INTRA-ARTICULAR; INTRAMUSCULAR at 15:38

## 2025-04-21 NOTE — PROGRESS NOTES
"Chief Complaint  Elbow Pain    Subjective    History of Present Illness    Here today with ongoing left elbow pain. Has been diagnosed with lateral epicondylitis previously, responded well to injection. Hoping for the same today. Has ongoing work requirements that continue to aggravate the area. Hoping very much to rest it and rehab it once he is done in about 4 to 6 weeks.    Due for a hep B vaccine today.    Objective     Vital Signs:   /74   Pulse 71   Resp 12   Ht 185.4 cm (73\")   Wt 80.3 kg (177 lb 1.6 oz)   SpO2 98%   BMI 23.37 kg/m²   Physical Exam  Vitals and nursing note reviewed.   Constitutional:       General: He is not in acute distress.     Appearance: Normal appearance. He is not ill-appearing.   Musculoskeletal:      Left elbow: Tenderness present in lateral epicondyle.   Neurological:      Mental Status: He is alert.          Arthrocentesis    Date/Time: 4/21/2025 1:39 PM    Performed by: Noah Rodriguez MD  Authorized by: Noah Rodriguez MD  Consent: Verbal consent obtained  Risks and benefits: risks, benefits and alternatives were discussed  Consent given by: patient  Patient understanding: patient states understanding of the procedure being performed  Site marked: the operative site was marked  Patient identity confirmed: verbally with patient  Time out: Immediately prior to procedure a \"time out\" was called to verify the correct patient, procedure, equipment, support staff and site/side marked as required.  Indications: joint swelling and pain   Body area: elbow  Joint: left elbow  Local anesthesia used: no    Anesthesia:  Local anesthesia used: no    Sedation:  Patient sedated: no    Preparation: Patient was prepped and draped in the usual sterile fashion.  Comments: Injected with 1 ml triamcinolone 40 mg/ml and 1 ml 1% lidocaine w/o epi.            Assessment and Plan   Diagnoses and all orders for this visit:    1. Left lateral epicondylitis (Primary)  -     " Arthrocentesis  -     lidocaine PF 1% (XYLOCAINE) injection 1 mL  -     triamcinolone acetonide (KENALOG-40) injection 40 mg    2. Need for vaccination  -     Hepatitis B 18+ 2 Dose Vaccine (HEPLISAV-B)    Tolerated injection well. Vaccine as requested.    Recommended follow up as below. Encouraged communication via [employer] in the meantime.     Patient was given instructions and counseling regarding his condition or for health maintenance advice. Please see specific information pulled into the AVS (placed there by myself) if appropriate.    Return if symptoms worsen or fail to improve.      KRISHAN Rodriguez MD

## 2025-05-09 RX ORDER — PREDNISONE 20 MG/1
40 TABLET ORAL DAILY
Qty: 10 TABLET | Refills: 0 | Status: SHIPPED | OUTPATIENT
Start: 2025-05-09 | End: 2025-05-14

## 2025-08-26 ENCOUNTER — OFFICE VISIT (OUTPATIENT)
Dept: FAMILY MEDICINE CLINIC | Facility: CLINIC | Age: 45
End: 2025-08-26
Payer: COMMERCIAL

## 2025-08-26 VITALS
HEART RATE: 59 BPM | OXYGEN SATURATION: 98 % | WEIGHT: 180.8 LBS | BODY MASS INDEX: 23.96 KG/M2 | HEIGHT: 73 IN | DIASTOLIC BLOOD PRESSURE: 90 MMHG | SYSTOLIC BLOOD PRESSURE: 126 MMHG | RESPIRATION RATE: 14 BRPM

## 2025-08-26 DIAGNOSIS — Z13.1 SCREENING FOR DIABETES MELLITUS: ICD-10-CM

## 2025-08-26 DIAGNOSIS — Z13.6 ENCOUNTER FOR LIPID SCREENING FOR CARDIOVASCULAR DISEASE: ICD-10-CM

## 2025-08-26 DIAGNOSIS — Z12.11 SCREENING FOR COLON CANCER: ICD-10-CM

## 2025-08-26 DIAGNOSIS — Z00.00 ROUTINE HEALTH MAINTENANCE: Primary | ICD-10-CM

## 2025-08-26 DIAGNOSIS — Z13.220 ENCOUNTER FOR LIPID SCREENING FOR CARDIOVASCULAR DISEASE: ICD-10-CM

## 2025-08-26 DIAGNOSIS — M77.12 LEFT LATERAL EPICONDYLITIS: ICD-10-CM

## 2025-08-26 DIAGNOSIS — R53.83 OTHER FATIGUE: ICD-10-CM

## 2025-08-26 LAB
ALBUMIN SERPL-MCNC: 4.6 G/DL (ref 3.5–5.2)
ALBUMIN/GLOB SERPL: 1.9 G/DL
ALP SERPL-CCNC: 43 U/L (ref 39–117)
ALT SERPL-CCNC: 9 U/L (ref 1–41)
AST SERPL-CCNC: 20 U/L (ref 1–40)
BILIRUB SERPL-MCNC: 0.5 MG/DL (ref 0–1.2)
BUN SERPL-MCNC: 10 MG/DL (ref 6–20)
BUN/CREAT SERPL: 10.5 (ref 7–25)
CALCIUM SERPL-MCNC: 10 MG/DL (ref 8.6–10.5)
CHLORIDE SERPL-SCNC: 102 MMOL/L (ref 98–107)
CHOLEST SERPL-MCNC: 278 MG/DL (ref 0–200)
CO2 SERPL-SCNC: 23.1 MMOL/L (ref 22–29)
CREAT SERPL-MCNC: 0.95 MG/DL (ref 0.76–1.27)
EGFRCR SERPLBLD CKD-EPI 2021: 101.2 ML/MIN/1.73
ERYTHROCYTE [DISTWIDTH] IN BLOOD BY AUTOMATED COUNT: 11.5 % (ref 12.3–15.4)
GLOBULIN SER CALC-MCNC: 2.4 GM/DL
GLUCOSE SERPL-MCNC: 91 MG/DL (ref 65–99)
HCT VFR BLD AUTO: 45.2 % (ref 37.5–51)
HDLC SERPL-MCNC: 103 MG/DL (ref 40–60)
HGB BLD-MCNC: 15.4 G/DL (ref 13–17.7)
LDLC SERPL CALC-MCNC: 170 MG/DL (ref 0–100)
MCH RBC QN AUTO: 31.5 PG (ref 26.6–33)
MCHC RBC AUTO-ENTMCNC: 34.1 G/DL (ref 31.5–35.7)
MCV RBC AUTO: 92.4 FL (ref 79–97)
PLATELET # BLD AUTO: 266 10*3/MM3 (ref 140–450)
POTASSIUM SERPL-SCNC: 4.7 MMOL/L (ref 3.5–5.2)
PROT SERPL-MCNC: 7 G/DL (ref 6–8.5)
RBC # BLD AUTO: 4.89 10*6/MM3 (ref 4.14–5.8)
SODIUM SERPL-SCNC: 140 MMOL/L (ref 136–145)
TRIGL SERPL-MCNC: 39 MG/DL (ref 0–150)
VLDLC SERPL CALC-MCNC: 5 MG/DL (ref 5–40)
WBC # BLD AUTO: 5.7 10*3/MM3 (ref 3.4–10.8)

## 2025-08-26 PROCEDURE — 99396 PREV VISIT EST AGE 40-64: CPT | Performed by: FAMILY MEDICINE

## 2025-08-26 PROCEDURE — 20605 DRAIN/INJ JOINT/BURSA W/O US: CPT | Performed by: FAMILY MEDICINE

## 2025-08-26 PROCEDURE — 99213 OFFICE O/P EST LOW 20 MIN: CPT | Performed by: FAMILY MEDICINE

## 2025-08-26 RX ORDER — TRIAMCINOLONE ACETONIDE 40 MG/ML
40 INJECTION, SUSPENSION INTRA-ARTICULAR; INTRAMUSCULAR ONCE
Status: COMPLETED | OUTPATIENT
Start: 2025-08-26 | End: 2025-08-26

## 2025-08-26 RX ORDER — LIDOCAINE HYDROCHLORIDE 10 MG/ML
2 INJECTION, SOLUTION EPIDURAL; INFILTRATION; INTRACAUDAL; PERINEURAL ONCE
Status: COMPLETED | OUTPATIENT
Start: 2025-08-26 | End: 2025-08-26

## 2025-08-26 RX ADMIN — TRIAMCINOLONE ACETONIDE 40 MG: 40 INJECTION, SUSPENSION INTRA-ARTICULAR; INTRAMUSCULAR at 11:56

## 2025-08-26 RX ADMIN — LIDOCAINE HYDROCHLORIDE 2 ML: 10 INJECTION, SOLUTION EPIDURAL; INFILTRATION; INTRACAUDAL; PERINEURAL at 11:55

## 2025-08-27 LAB — TESTOST SERPL-MCNC: 535 NG/DL (ref 264–916)
